# Patient Record
Sex: FEMALE | URBAN - METROPOLITAN AREA
[De-identification: names, ages, dates, MRNs, and addresses within clinical notes are randomized per-mention and may not be internally consistent; named-entity substitution may affect disease eponyms.]

---

## 2017-06-16 ENCOUNTER — APPOINTMENT (RX ONLY)
Dept: URBAN - METROPOLITAN AREA CLINIC 158 | Facility: CLINIC | Age: 77
Setting detail: DERMATOLOGY
End: 2017-06-16

## 2017-06-16 DIAGNOSIS — L72.0 EPIDERMAL CYST: ICD-10-CM

## 2017-06-16 DIAGNOSIS — D49.2 NEOPLASM OF UNSPECIFIED BEHAVIOR OF BONE, SOFT TISSUE, AND SKIN: ICD-10-CM

## 2017-06-16 DIAGNOSIS — L81.4 OTHER MELANIN HYPERPIGMENTATION: ICD-10-CM

## 2017-06-16 DIAGNOSIS — L57.0 ACTINIC KERATOSIS: ICD-10-CM

## 2017-06-16 DIAGNOSIS — L82.1 OTHER SEBORRHEIC KERATOSIS: ICD-10-CM

## 2017-06-16 PROCEDURE — ? BIOPSY BY SHAVE METHOD

## 2017-06-16 PROCEDURE — ? BENIGN DESTRUCTION

## 2017-06-16 PROCEDURE — 17000 DESTRUCT PREMALG LESION: CPT | Mod: 59

## 2017-06-16 PROCEDURE — ? LIQUID NITROGEN

## 2017-06-16 PROCEDURE — 17003 DESTRUCT PREMALG LES 2-14: CPT

## 2017-06-16 PROCEDURE — 11100: CPT | Mod: 59

## 2017-06-16 PROCEDURE — 99202 OFFICE O/P NEW SF 15 MIN: CPT | Mod: 25

## 2017-06-16 PROCEDURE — 17110 DESTRUCTION B9 LES UP TO 14: CPT

## 2017-06-16 PROCEDURE — ? COUNSELING

## 2017-06-16 ASSESSMENT — LOCATION DETAILED DESCRIPTION DERM
LOCATION DETAILED: LEFT DISTAL DORSAL FOREARM
LOCATION DETAILED: RIGHT DISTAL DORSAL FOREARM
LOCATION DETAILED: NASAL DORSUM
LOCATION DETAILED: LEFT PROXIMAL DORSAL FOREARM
LOCATION DETAILED: NASAL SUPRATIP
LOCATION DETAILED: RIGHT INFERIOR LATERAL FOREHEAD
LOCATION DETAILED: RIGHT RADIAL DORSAL HAND
LOCATION DETAILED: RIGHT INFERIOR FOREHEAD
LOCATION DETAILED: LEFT DISTAL RADIAL DORSAL FOREARM
LOCATION DETAILED: RIGHT PROXIMAL DORSAL FOREARM
LOCATION DETAILED: RIGHT INFERIOR LATERAL NECK
LOCATION DETAILED: NASAL TIP

## 2017-06-16 ASSESSMENT — LOCATION SIMPLE DESCRIPTION DERM
LOCATION SIMPLE: RIGHT ANTERIOR NECK
LOCATION SIMPLE: NOSE
LOCATION SIMPLE: RIGHT HAND
LOCATION SIMPLE: RIGHT FOREHEAD
LOCATION SIMPLE: RIGHT FOREARM
LOCATION SIMPLE: LEFT FOREARM

## 2017-06-16 ASSESSMENT — LOCATION ZONE DERM
LOCATION ZONE: NOSE
LOCATION ZONE: HAND
LOCATION ZONE: ARM
LOCATION ZONE: FACE
LOCATION ZONE: NECK

## 2017-06-16 NOTE — PROCEDURE: BIOPSY BY SHAVE METHOD
Billing Type: Third-Party Bill
X Size Of Lesion In Cm: 0
Electrodesiccation And Curettage Text: The wound bed was treated with electrodesiccation and curettage after the biopsy was performed.
Render Post-Care Instructions In Note?: yes
Bill For Surgical Tray: no
Type Of Destruction Used: Curettage
Post-Care Instructions: I reviewed with the patient in detail post-care instructions. Patient is to keep the biopsy site dry overnight, and then apply bacitracin twice daily until healed. Patient may apply hydrogen peroxide soaks to remove any crusting.
Silver Nitrate Text: The wound bed was treated with silver nitrate after the biopsy was performed.
Biopsy Method: Dermablade
Consent: Written consent was obtained and risks were reviewed including but not limited to scarring, infection, bleeding, scabbing, incomplete removal, nerve damage and allergy to anesthesia.
Biopsy Type: H and E
Detail Level: Detailed
Wound Care: Vaseline
Anesthesia Volume In Cc: 0.5
Hemostasis: Get's
Anesthesia Type: 1% lidocaine without epinephrine
Dressing: bandage
Curettage Text: The wound bed was treated with curettage after the biopsy was performed.
Cryotherapy Text: The wound bed was treated with cryotherapy after the biopsy was performed.
Notification Instructions: Patient will be notified of biopsy results. However, patient instructed to call the office if not contacted within 2 weeks.
Electrodesiccation Text: The wound bed was treated with electrodesiccation after the biopsy was performed.

## 2017-06-16 NOTE — PROCEDURE: LIQUID NITROGEN
Number Of Freeze-Thaw Cycles: 1 freeze-thaw cycle
Duration Of Freeze Thaw-Cycle (Seconds): 5
Render Post-Care Instructions In Note?: yes
Detail Level: Detailed
Post-Care Instructions: I reviewed with the patient in detail post-care instructions. Patient is to wear sunprotection, and avoid picking at any of the treated lesions. Pt may apply Vaseline to crusted or scabbing areas.
Consent: The patient's consent was obtained including but not limited to risks of crusting, scabbing, blistering, scarring, darker or lighter pigmentary change, recurrence, incomplete removal and infection.

## 2017-06-16 NOTE — PROCEDURE: BENIGN DESTRUCTION
Detail Level: Detailed
Post-Care Instructions: I reviewed with the patient in detail post-care instructions. Patient is to wear sunprotection, and avoid picking at any of the treated lesions. Pt may apply Vaseline to crusted or scabbing areas.
Anesthesia Volume In Cc: 0.5
Medical Necessity Information: It is in your best interest to select a reason for this procedure from the list below. All of these items fulfill various CMS LCD requirements except the new and changing color options.
Add 52 Modifier (Optional): no
Bill Insurance (You Assume Risk Of Denial Or Audit By Selecting Yes): Yes
Medical Necessity Clause: This procedure was medically necessary because the lesions that were treated were: irritated
Consent: The patient's consent was obtained including but not limited to risks of crusting, scabbing, blistering, scarring, darker or lighter pigmentary change, recurrence, incomplete removal and infection.

## 2017-09-14 ENCOUNTER — APPOINTMENT (RX ONLY)
Dept: URBAN - METROPOLITAN AREA CLINIC 158 | Facility: CLINIC | Age: 77
Setting detail: DERMATOLOGY
End: 2017-09-14

## 2017-09-14 DIAGNOSIS — L30.4 ERYTHEMA INTERTRIGO: ICD-10-CM

## 2017-09-14 DIAGNOSIS — L72.0 EPIDERMAL CYST: ICD-10-CM

## 2017-09-14 DIAGNOSIS — L57.0 ACTINIC KERATOSIS: ICD-10-CM

## 2017-09-14 PROCEDURE — 99213 OFFICE O/P EST LOW 20 MIN: CPT | Mod: 25

## 2017-09-14 PROCEDURE — ? TREATMENT REGIMEN

## 2017-09-14 PROCEDURE — ? PRESCRIPTION

## 2017-09-14 PROCEDURE — 17003 DESTRUCT PREMALG LES 2-14: CPT

## 2017-09-14 PROCEDURE — ? LIQUID NITROGEN

## 2017-09-14 PROCEDURE — ? COUNSELING

## 2017-09-14 PROCEDURE — 17000 DESTRUCT PREMALG LESION: CPT

## 2017-09-14 RX ORDER — TRETINOIN 0.5 MG/G
APPLY CREAM TOPICAL QHS
Qty: 45 | Refills: 11 | Status: ERX | COMMUNITY
Start: 2017-09-14

## 2017-09-14 RX ORDER — DESONIDE 0.5 MG/G
APPLY CREAM TOPICAL BID
Qty: 60 | Refills: 11 | Status: ERX | COMMUNITY
Start: 2017-09-14

## 2017-09-14 RX ADMIN — TRETINOIN APPLY: 0.5 CREAM TOPICAL at 20:25

## 2017-09-14 RX ADMIN — DESONIDE APPLY: 0.5 CREAM TOPICAL at 20:27

## 2017-09-14 ASSESSMENT — LOCATION DETAILED DESCRIPTION DERM
LOCATION DETAILED: NASAL DORSUM
LOCATION DETAILED: NASAL TIP
LOCATION DETAILED: PERIUMBILICAL SKIN
LOCATION DETAILED: RIGHT LOWER CUTANEOUS LIP
LOCATION DETAILED: NASAL ROOT
LOCATION DETAILED: LEFT RADIAL DORSAL HAND

## 2017-09-14 ASSESSMENT — LOCATION ZONE DERM
LOCATION ZONE: HAND
LOCATION ZONE: LIP
LOCATION ZONE: NOSE
LOCATION ZONE: TRUNK

## 2017-09-14 ASSESSMENT — LOCATION SIMPLE DESCRIPTION DERM
LOCATION SIMPLE: LEFT HAND
LOCATION SIMPLE: ABDOMEN
LOCATION SIMPLE: RIGHT LIP
LOCATION SIMPLE: NOSE

## 2017-09-14 NOTE — PROCEDURE: LIQUID NITROGEN
Duration Of Freeze Thaw-Cycle (Seconds): 5
Consent: The patient's consent was obtained including but not limited to risks of crusting, scabbing, blistering, scarring, darker or lighter pigmentary change, recurrence, incomplete removal and infection.
Detail Level: Detailed
Render Post-Care Instructions In Note?: no
Number Of Freeze-Thaw Cycles: 1 freeze-thaw cycle
Post-Care Instructions: I reviewed with the patient in detail post-care instructions. Patient is to wear sunprotection, and avoid picking at any of the treated lesions. Pt may apply Vaseline to crusted or scabbing areas.

## 2017-09-14 NOTE — PROCEDURE: TREATMENT REGIMEN
Detail Level: Zone
Initiate Treatment: tretinoin 0.05 % topical cream QHS
Initiate Treatment: desonide 0.05 % topical cream BID